# Patient Record
Sex: MALE
[De-identification: names, ages, dates, MRNs, and addresses within clinical notes are randomized per-mention and may not be internally consistent; named-entity substitution may affect disease eponyms.]

---

## 2019-10-22 ENCOUNTER — HOSPITAL ENCOUNTER (INPATIENT)
Dept: HOSPITAL 90 - EDH | Age: 49
LOS: 3 days | Discharge: HOME | DRG: 988 | End: 2019-10-25
Attending: INTERNAL MEDICINE | Admitting: INTERNAL MEDICINE
Payer: SELF-PAY

## 2019-10-22 VITALS — WEIGHT: 219.6 LBS | BODY MASS INDEX: 38.91 KG/M2 | HEIGHT: 63 IN

## 2019-10-22 DIAGNOSIS — B95.61: ICD-10-CM
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
## 2019-10-22 DIAGNOSIS — L02.612: ICD-10-CM
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
## 2019-10-22 DIAGNOSIS — E66.9: ICD-10-CM

## 2019-10-22 DIAGNOSIS — F17.210: ICD-10-CM
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
## 2019-10-22 DIAGNOSIS — L03.116: ICD-10-CM
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
## 2019-10-22 DIAGNOSIS — E11.628: Primary | ICD-10-CM

## 2019-10-22 DIAGNOSIS — I10: ICD-10-CM

## 2019-10-22 DIAGNOSIS — E11.65: ICD-10-CM

## 2019-10-22 LAB
ALBUMIN SERPL-MCNC: 4.3 G/DL (ref 3.5–5)
ALT SERPL-CCNC: 44 U/L (ref 12–78)
AST SERPL-CCNC: 17 U/L (ref 10–37)
BASOPHILS NFR BLD AUTO: 0.8 % (ref 0–5)
BILIRUB SERPL-MCNC: 0.7 MG/DL (ref 0.2–1)
BUN SERPL-MCNC: 22 MG/DL (ref 7–18)
CHLORIDE SERPL-SCNC: 101 MMOL/L (ref 101–111)
CO2 SERPL-SCNC: 27 MMOL/L (ref 21–32)
CREAT SERPL-MCNC: 1.1 MG/DL (ref 0.5–1.5)
EOSINOPHIL NFR BLD AUTO: 1.2 % (ref 0–8)
ERYTHROCYTE [DISTWIDTH] IN BLOOD BY AUTOMATED COUNT: 12.5 % (ref 11–15.5)
GFR SERPL CREATININE-BSD FRML MDRD: 75 ML/MIN (ref 60–?)
GLUCOSE SERPL-MCNC: 258 MG/DL (ref 70–105)
GLUCOSE UR STRIP-MCNC: >=1000 MG/DL
HCT VFR BLD AUTO: 45.3 % (ref 42–54)
LYMPHOCYTES NFR SPEC AUTO: 21.9 % (ref 21–51)
MCH RBC QN AUTO: 30.9 PG (ref 27–33)
MCHC RBC AUTO-ENTMCNC: 34.8 G/DL (ref 32–36)
MCV RBC AUTO: 88.6 FL (ref 79–99)
MONOCYTES NFR BLD AUTO: 7.9 % (ref 3–13)
NEUTROPHILS NFR BLD AUTO: 68.2 % (ref 40–77)
NRBC BLD MANUAL-RTO: 0 % (ref 0–0.19)
PH UR STRIP: 6 [PH] (ref 5–8)
PLATELET # BLD AUTO: 273 K/UL (ref 130–400)
POTASSIUM SERPL-SCNC: 4 MMOL/L (ref 3.5–5.1)
PROT SERPL-MCNC: 8.7 G/DL (ref 6–8.3)
RBC # BLD AUTO: 5.12 MIL/UL (ref 4.5–6.2)
SODIUM SERPL-SCNC: 137 MMOL/L (ref 136–145)
SP GR UR STRIP: 1.04 (ref 1–1.03)
UROBILINOGEN UR STRIP-MCNC: 2 MG/DL (ref 0.2–1)
WBC # BLD AUTO: 15.1 K/UL (ref 4.8–10.8)

## 2019-10-22 PROCEDURE — 82948 REAGENT STRIP/BLOOD GLUCOSE: CPT

## 2019-10-22 PROCEDURE — 83036 HEMOGLOBIN GLYCOSYLATED A1C: CPT

## 2019-10-22 PROCEDURE — 87077 CULTURE AEROBIC IDENTIFY: CPT

## 2019-10-22 PROCEDURE — 87186 SC STD MICRODIL/AGAR DIL: CPT

## 2019-10-22 PROCEDURE — 85025 COMPLETE CBC W/AUTO DIFF WBC: CPT

## 2019-10-22 PROCEDURE — 80053 COMPREHEN METABOLIC PANEL: CPT

## 2019-10-22 PROCEDURE — 73630 X-RAY EXAM OF FOOT: CPT

## 2019-10-22 PROCEDURE — 87040 BLOOD CULTURE FOR BACTERIA: CPT

## 2019-10-22 PROCEDURE — 93970 EXTREMITY STUDY: CPT

## 2019-10-22 PROCEDURE — 81003 URINALYSIS AUTO W/O SCOPE: CPT

## 2019-10-22 PROCEDURE — 87076 CULTURE ANAEROBE IDENT EACH: CPT

## 2019-10-22 PROCEDURE — 36415 COLL VENOUS BLD VENIPUNCTURE: CPT

## 2019-10-22 PROCEDURE — 71045 X-RAY EXAM CHEST 1 VIEW: CPT

## 2019-10-22 PROCEDURE — 87070 CULTURE OTHR SPECIMN AEROBIC: CPT

## 2019-10-23 VITALS — DIASTOLIC BLOOD PRESSURE: 75 MMHG | SYSTOLIC BLOOD PRESSURE: 106 MMHG

## 2019-10-23 VITALS — SYSTOLIC BLOOD PRESSURE: 119 MMHG | DIASTOLIC BLOOD PRESSURE: 89 MMHG

## 2019-10-23 VITALS — DIASTOLIC BLOOD PRESSURE: 54 MMHG | SYSTOLIC BLOOD PRESSURE: 103 MMHG

## 2019-10-23 VITALS — SYSTOLIC BLOOD PRESSURE: 128 MMHG | DIASTOLIC BLOOD PRESSURE: 59 MMHG

## 2019-10-23 VITALS — DIASTOLIC BLOOD PRESSURE: 73 MMHG | SYSTOLIC BLOOD PRESSURE: 120 MMHG

## 2019-10-23 VITALS — SYSTOLIC BLOOD PRESSURE: 123 MMHG | DIASTOLIC BLOOD PRESSURE: 83 MMHG

## 2019-10-23 VITALS — SYSTOLIC BLOOD PRESSURE: 107 MMHG | DIASTOLIC BLOOD PRESSURE: 75 MMHG

## 2019-10-23 LAB
ALBUMIN SERPL-MCNC: 3.5 G/DL (ref 3.5–5)
ALT SERPL-CCNC: 36 U/L (ref 12–78)
AST SERPL-CCNC: 16 U/L (ref 10–37)
BASOPHILS NFR BLD AUTO: 1 % (ref 0–5)
BILIRUB SERPL-MCNC: 0.6 MG/DL (ref 0.2–1)
BUN SERPL-MCNC: 17 MG/DL (ref 7–18)
CHLORIDE SERPL-SCNC: 103 MMOL/L (ref 101–111)
CO2 SERPL-SCNC: 27 MMOL/L (ref 21–32)
CREAT SERPL-MCNC: 0.9 MG/DL (ref 0.5–1.5)
EOSINOPHIL NFR BLD AUTO: 1.7 % (ref 0–8)
ERYTHROCYTE [DISTWIDTH] IN BLOOD BY AUTOMATED COUNT: 12.6 % (ref 11–15.5)
GFR SERPL CREATININE-BSD FRML MDRD: 95 ML/MIN (ref 60–?)
GLUCOSE SERPL-MCNC: 290 MG/DL (ref 70–105)
HBA1C MFR BLD: 10.9 % (ref 4–6)
HCT VFR BLD AUTO: 39.6 % (ref 42–54)
LYMPHOCYTES NFR SPEC AUTO: 26.3 % (ref 21–51)
MCH RBC QN AUTO: 30.3 PG (ref 27–33)
MCHC RBC AUTO-ENTMCNC: 34.6 G/DL (ref 32–36)
MCV RBC AUTO: 87.6 FL (ref 79–99)
MONOCYTES NFR BLD AUTO: 6.8 % (ref 3–13)
NEUTROPHILS NFR BLD AUTO: 64.2 % (ref 40–77)
NRBC BLD MANUAL-RTO: 0 % (ref 0–0.19)
PLATELET # BLD AUTO: 231 K/UL (ref 130–400)
POTASSIUM SERPL-SCNC: 3.5 MMOL/L (ref 3.5–5.1)
PROT SERPL-MCNC: 7.1 G/DL (ref 6–8.3)
RBC # BLD AUTO: 4.53 MIL/UL (ref 4.5–6.2)
SODIUM SERPL-SCNC: 138 MMOL/L (ref 136–145)
WBC # BLD AUTO: 12.3 K/UL (ref 4.8–10.8)

## 2019-10-23 PROCEDURE — 0S9N0ZZ DRAINAGE OF LEFT METATARSAL-PHALANGEAL JOINT, OPEN APPROACH: ICD-10-PCS | Performed by: PODIATRIST

## 2019-10-23 RX ADMIN — SODIUM CHLORIDE SCH UNIT: 9 INJECTION, SOLUTION INTRAVENOUS at 16:30

## 2019-10-23 RX ADMIN — FAMOTIDINE SCH MG: 20 TABLET, FILM COATED ORAL at 20:54

## 2019-10-23 RX ADMIN — TRAMADOL HYDROCHLORIDE PRN MG: 50 TABLET, FILM COATED ORAL at 12:43

## 2019-10-23 RX ADMIN — PIPERACILLIN SODIUM AND TAZOBACTAM SODIUM SCH MLS/HR: .375; 3 INJECTION, POWDER, LYOPHILIZED, FOR SOLUTION INTRAVENOUS at 12:43

## 2019-10-23 RX ADMIN — SODIUM CHLORIDE SCH UNIT: 9 INJECTION, SOLUTION INTRAVENOUS at 20:48

## 2019-10-23 RX ADMIN — TRAMADOL HYDROCHLORIDE PRN MG: 50 TABLET, FILM COATED ORAL at 20:56

## 2019-10-23 RX ADMIN — ENOXAPARIN SODIUM SCH MG: 30 INJECTION SUBCUTANEOUS at 09:15

## 2019-10-23 RX ADMIN — FAMOTIDINE SCH MG: 20 TABLET, FILM COATED ORAL at 09:07

## 2019-10-23 RX ADMIN — PIPERACILLIN SODIUM AND TAZOBACTAM SODIUM SCH MLS/HR: .375; 3 INJECTION, POWDER, LYOPHILIZED, FOR SOLUTION INTRAVENOUS at 20:54

## 2019-10-23 RX ADMIN — SODIUM CHLORIDE SCH UNIT: 9 INJECTION, SOLUTION INTRAVENOUS at 12:42

## 2019-10-23 RX ADMIN — SODIUM CHLORIDE SCH UNIT: 9 INJECTION, SOLUTION INTRAVENOUS at 07:58

## 2019-10-23 NOTE — NUR
INITIAL

MET W PT AND  SPOUSE MANJULA WHO WILL PROVIDE TRANSPORT HOME

 PT IS F[employer], ACTIVE, [employer], EMPLOYED FT, NO INSURANCE AVAILABLE THROUGH HIS EMPLOYMENT

RECENTLY STARTED GOING TO  DAY AND NIGHT CLINIC

 HAS GLUCOMETER, CHECKS SUGARS, 

PLAN IS HOME, WILL PROVIDE [employer] PKT AND INFO ON DIABETIC SELF MANANGEMENT 
CLINIC

ASKED RN FOR [facility], WILL OBTAIN ORDER

-------------------------------------------------------------------------------

Addendum: 10/23/19 at 1453 by VICTOR M PATINO RN CM

-------------------------------------------------------------------------------

Amended: Links added.

## 2019-10-24 VITALS — DIASTOLIC BLOOD PRESSURE: 88 MMHG | SYSTOLIC BLOOD PRESSURE: 137 MMHG

## 2019-10-24 VITALS — DIASTOLIC BLOOD PRESSURE: 75 MMHG | SYSTOLIC BLOOD PRESSURE: 113 MMHG

## 2019-10-24 VITALS — DIASTOLIC BLOOD PRESSURE: 88 MMHG | SYSTOLIC BLOOD PRESSURE: 134 MMHG

## 2019-10-24 VITALS — DIASTOLIC BLOOD PRESSURE: 88 MMHG | SYSTOLIC BLOOD PRESSURE: 133 MMHG

## 2019-10-24 VITALS — DIASTOLIC BLOOD PRESSURE: 81 MMHG | SYSTOLIC BLOOD PRESSURE: 127 MMHG

## 2019-10-24 RX ADMIN — TRAMADOL HYDROCHLORIDE PRN MG: 50 TABLET, FILM COATED ORAL at 05:44

## 2019-10-24 RX ADMIN — PIPERACILLIN SODIUM AND TAZOBACTAM SODIUM SCH MLS/HR: .375; 3 INJECTION, POWDER, LYOPHILIZED, FOR SOLUTION INTRAVENOUS at 05:34

## 2019-10-24 RX ADMIN — TRAMADOL HYDROCHLORIDE PRN MG: 50 TABLET, FILM COATED ORAL at 11:58

## 2019-10-24 RX ADMIN — SODIUM CHLORIDE SCH UNIT: 9 INJECTION, SOLUTION INTRAVENOUS at 11:56

## 2019-10-24 RX ADMIN — FAMOTIDINE SCH MG: 20 TABLET, FILM COATED ORAL at 21:09

## 2019-10-24 RX ADMIN — SODIUM CHLORIDE SCH UNIT: 9 INJECTION, SOLUTION INTRAVENOUS at 05:34

## 2019-10-24 RX ADMIN — PIPERACILLIN SODIUM AND TAZOBACTAM SODIUM SCH MLS/HR: .375; 3 INJECTION, POWDER, LYOPHILIZED, FOR SOLUTION INTRAVENOUS at 21:08

## 2019-10-24 RX ADMIN — FAMOTIDINE SCH MG: 20 TABLET, FILM COATED ORAL at 09:17

## 2019-10-24 RX ADMIN — SODIUM CHLORIDE SCH UNIT: 9 INJECTION, SOLUTION INTRAVENOUS at 21:10

## 2019-10-24 RX ADMIN — TRAMADOL HYDROCHLORIDE PRN MG: 50 TABLET, FILM COATED ORAL at 23:35

## 2019-10-24 RX ADMIN — PIPERACILLIN SODIUM AND TAZOBACTAM SODIUM SCH MLS/HR: .375; 3 INJECTION, POWDER, LYOPHILIZED, FOR SOLUTION INTRAVENOUS at 13:45

## 2019-10-24 RX ADMIN — ENOXAPARIN SODIUM SCH MG: 30 INJECTION SUBCUTANEOUS at 09:19

## 2019-10-24 NOTE — NUR
Wound Dressing Care



Wound dressing done, tolerated well by pt, No complication noted, Pt premedicated prior to 
wound dressing. Nursing will continue to monitor.

## 2019-10-25 VITALS — DIASTOLIC BLOOD PRESSURE: 84 MMHG | SYSTOLIC BLOOD PRESSURE: 138 MMHG

## 2019-10-25 VITALS — DIASTOLIC BLOOD PRESSURE: 78 MMHG | SYSTOLIC BLOOD PRESSURE: 130 MMHG

## 2019-10-25 VITALS — DIASTOLIC BLOOD PRESSURE: 62 MMHG | SYSTOLIC BLOOD PRESSURE: 117 MMHG

## 2019-10-25 VITALS — DIASTOLIC BLOOD PRESSURE: 57 MMHG | SYSTOLIC BLOOD PRESSURE: 108 MMHG

## 2019-10-25 RX ADMIN — TRAMADOL HYDROCHLORIDE PRN MG: 50 TABLET, FILM COATED ORAL at 06:55

## 2019-10-25 RX ADMIN — SODIUM CHLORIDE SCH UNIT: 9 INJECTION, SOLUTION INTRAVENOUS at 13:01

## 2019-10-25 RX ADMIN — FAMOTIDINE SCH MG: 20 TABLET, FILM COATED ORAL at 08:30

## 2019-10-25 RX ADMIN — SODIUM CHLORIDE SCH UNIT: 9 INJECTION, SOLUTION INTRAVENOUS at 06:44

## 2019-10-25 RX ADMIN — ENOXAPARIN SODIUM SCH MG: 30 INJECTION SUBCUTANEOUS at 08:31

## 2019-10-25 RX ADMIN — PIPERACILLIN SODIUM AND TAZOBACTAM SODIUM SCH MLS/HR: .375; 3 INJECTION, POWDER, LYOPHILIZED, FOR SOLUTION INTRAVENOUS at 05:04

## 2019-10-25 NOTE — NUR
PT D/C HOME 



Pt d/c home safely, d/c instruction provided, wound dressing teaching provided to spouse 
with teach back, PIV d/c without complication, pt to follow up with Dr. Galarza for wound 
care, pt self referral and provided a list of PCPs, pt verbalized understanding of d/c 
instructions, Podiatry follow up appointment made for pt, all questions and concerns 
addressed. Pt taken down in w/c by CNA.